# Patient Record
Sex: FEMALE
[De-identification: names, ages, dates, MRNs, and addresses within clinical notes are randomized per-mention and may not be internally consistent; named-entity substitution may affect disease eponyms.]

---

## 2021-04-15 ENCOUNTER — APPOINTMENT (OUTPATIENT)
Dept: HEART AND VASCULAR | Facility: CLINIC | Age: 61
End: 2021-04-15

## 2022-07-01 ENCOUNTER — APPOINTMENT (OUTPATIENT)
Dept: COLORECTAL SURGERY | Facility: CLINIC | Age: 62
End: 2022-07-01

## 2022-07-01 ENCOUNTER — NON-APPOINTMENT (OUTPATIENT)
Age: 62
End: 2022-07-01

## 2022-07-01 VITALS
WEIGHT: 240.13 LBS | OXYGEN SATURATION: 96 % | HEART RATE: 76 BPM | HEIGHT: 69 IN | DIASTOLIC BLOOD PRESSURE: 81 MMHG | SYSTOLIC BLOOD PRESSURE: 189 MMHG | BODY MASS INDEX: 35.57 KG/M2

## 2022-07-01 DIAGNOSIS — K64.2 THIRD DEGREE HEMORRHOIDS: ICD-10-CM

## 2022-07-01 PROCEDURE — 99203 OFFICE O/P NEW LOW 30 MIN: CPT | Mod: 25

## 2022-07-01 PROCEDURE — 46600 DIAGNOSTIC ANOSCOPY SPX: CPT

## 2022-07-01 NOTE — PHYSICAL EXAM
[Normal Breath Sounds] : Normal breath sounds [Normal Heart Sounds] : normal heart sounds [2+] : left 2+ [No Rash or Lesion] : No rash or lesion [Alert] : alert [Oriented to Person] : oriented to person [Oriented to Place] : oriented to place [Oriented to Time] : oriented to time [Calm] : calm [Reduce Spontaneously] : a spontaneously reducible (grade II) [Skin Tags] : residual hemorrhoidal skin tags were noted [Normal] : was normal [Anterior] : anteriorly [None] : there was no rectal mass  [Abdomen Masses] : No abdominal masses [Abdomen Tenderness] : ~T No ~M abdominal tenderness [Excoriation] : no perianal excoriation [Fistula] : no fistulas [Wart] : no warts [Ulcer ___ cm] : no ulcers [Tender, Swollen] : nontender, non-swollen [Thrombosed] : that was not thrombosed [JVD] : no jugular venous distention  [Thyroid] : the thyroid was abnormal [Carotid Bruits] : no carotid bruits [de-identified] : benign, no distension, masses, or tenderness [de-identified] : Large anterior external hemorrhoids.  With push prolapse of proimal anoderm and internal hemorrhoid tissue (ecchymotic). Digital: tone WNL, some discomfort with digital.  anoscopy with metal anoscope: grade 3 mixed hemorrhoids with inflamed internal anterior hemorrhoids, RA and LA  [de-identified] : at time grade 3 [de-identified] : large and tall anteriorly (50% circumference) [de-identified] : N AD

## 2022-07-01 NOTE — ASSESSMENT
[FreeTextEntry1] : Weight 242 lb\par height:  5' 9""\par BMI 35.46\par \par \par Grade 3 -4 mixed hemorrhoids RA and LA predominate with large external component (3 cm+ long)  as well. \par Notes some bleeding between BM's.  Had colonoscopy that was negative for polyp or ulcer.\par Bleeds with each BM.  Has increased over the last few years.  Wants treatment.  Surgery is only reasonable option. Banding not an option in my view.  \par Stools not too hard.\par REviewed risks and benefits of surgery.  Alternative of following on stool softerners, fiber supplements, hydrocortison cream (which she already has and is using), avoiding straining\par Risk of infection. bleeding, recurrence, other complications discussed.  Handouts from web site and drawings given.  \par Would need medical and pulmonary clearance.  She has PMD and lung MD (Name she will get for us)\par

## 2022-07-01 NOTE — HISTORY OF PRESENT ILLNESS
[FreeTextEntry1] : 63 yo woman with c/o hemorrhoids c/o bleeding with BM's.  Has BM's q 2 days.  MOM taken if gets constipated (over 3 days minus BM).  Prolapse at times.   Been noted over a year at this point.  No pain with BM.  Never had prior Rx for hemorrhoids.   Mild anemia for which she is on iron pills.\par No family history of colorectal cancer.\par \par \par PSH:\par None\par \par \par PMH\par Hx Dry mouth  (Sjorgens syndrome)\par card: +HTN, + high cholesterol,  occsaional chest pain (had EKG done least week for chest pain and it was ok as per patient), no hx MI, now can walk 3-4 blocks before as SOB\par pulm: Occasional SOB and asthma (prn ventolin), no ER visits, has home nebulizer (prn use), not a smoker, no bronchitis.\par No DM\par GI: + gastritis (heartburn, GERD), Has had EGD (April 2022 after she was given antibiotics for H pylori and she took the treatment)  no hepatitis, PUD\par : No UTI, stones, hematuria, dysuria\par no heavy bleeding.  She heals normally.\par neuro: had feinted 2014 but w/u not conclusive, no seizure, CVA, No TIA she claims\par \par \par \par Meds\par atenolool (HTN)\par ventloin \par crestor\par colace (prn)\par Allergies: none

## 2022-07-22 ENCOUNTER — APPOINTMENT (OUTPATIENT)
Dept: HEART AND VASCULAR | Facility: CLINIC | Age: 62
End: 2022-07-22